# Patient Record
Sex: FEMALE | Race: WHITE | NOT HISPANIC OR LATINO | Employment: FULL TIME | URBAN - METROPOLITAN AREA
[De-identification: names, ages, dates, MRNs, and addresses within clinical notes are randomized per-mention and may not be internally consistent; named-entity substitution may affect disease eponyms.]

---

## 2024-08-08 ENCOUNTER — TELEPHONE (OUTPATIENT)
Dept: ENDOCRINOLOGY | Facility: CLINIC | Age: 49
End: 2024-08-08
Payer: COMMERCIAL

## 2024-08-12 ENCOUNTER — TELEPHONE (OUTPATIENT)
Dept: ENDOCRINOLOGY | Facility: CLINIC | Age: 49
End: 2024-08-12
Payer: COMMERCIAL

## 2024-08-12 NOTE — TELEPHONE ENCOUNTER
Called pt and got her scheduled for an appt in December. Pt also requested to be put on the wait list. I also let her know where we were located.

## 2024-12-23 NOTE — PROGRESS NOTES
Endocrinology New Patient Visit    Subjective:      Chief Complaint: Consult and Thyroid Nodule (Discuss meds and established care)      HPI: Shana Barry is a 49 y.o. female who is here for an initial evaluation for post-op hypothyroidism. She also has a history of RA, mast cell activation syndrome, EDS and POTS.    She lives close to [facility].    Total thyroidectomy on 1/3/00 at [facility] in [facility] by Dr Harp with surgical pathology showing 2-cm PTC (? tall cell) in left lobe, right lobe negative, 1 perithyroidal LN positive.   She was treated with radioactive iodine at dose of 100 mCi on 5/19/00     Post-therapy Whole Body Scan (WBS) on 5/23/00 showed thyroid bed uptake only.    In 2004 she had a PET scan which revealed abnormal foci. She underwent right modified radical neck dissection (MRND) on 3/26/04 at [facility] [place] in [place] with 18/18 lymph nodes negative.   She is actively being treated with thyroid hormone suppression therapy via levothyroxine at current dose of 205.5 mcg/day     Currently taking:  Liothyronine 5 mg qOther day  Levoxyl 175 mcg daily - taking it appropriately fasting first thing in the AM.    She has some fatigue, excessive sweating in the morning (mostly happens when taking liothyronine), mild tremors.    Denies any other symptoms.   Also on modafinil and atomoxetine.    She had a cyst in her thyroid bed that had increased in size multiple times and had it drained. Something was injected after the last drainage around 8 years ago.  Had US every other year.  Thyroglobulin has been undetectable for the last 5 years at least.    Last US 2019 (outside records)  Examination of the thyroid beds revealed anechoic structure   with posterior shadowing and no flow at 2.23 x 1.41 x 1.98-cm centimeters in   size     Lab Results   Component Value Date    TSH 0.45 09/09/2024    TSH 0.38 (L) 06/04/2024    TSH 3.3 05/25/2006    FREET4 1.44  "06/04/2024       Reviewed past medical, family, social history and updated as appropriate.    Objective:     Vitals:    12/26/24 1112   BP: 134/82   Pulse: 95       Physical Exam  Vitals reviewed.   Constitutional:       General: She is not in acute distress.     Appearance: Normal appearance. She is not toxic-appearing.   Eyes:      Extraocular Movements: Extraocular movements intact.      Conjunctiva/sclera: Conjunctivae normal.      Pupils: Pupils are equal, round, and reactive to light.   Neck:      Thyroid: No thyroid mass, thyromegaly or thyroid tenderness.      Comments: Thyroidectomy and R neck dissection scars  Pulmonary:      Effort: Pulmonary effort is normal. No respiratory distress.   Musculoskeletal:      Cervical back: Neck supple. No tenderness.   Lymphadenopathy:      Cervical: No cervical adenopathy.   Skin:     General: Skin is warm and dry.   Neurological:      General: No focal deficit present.      Mental Status: She is alert and oriented to person, place, and time.   Psychiatric:         Mood and Affect: Mood normal.         Thought Content: Thought content normal.           Wt Readings from Last 30 Encounters:   12/26/24 1112 121.1 kg (266 lb 15.6 oz)        No results found for: "CHOL", "HDL", "LDLCALC", "TRIG", "CHOLHDL"  Lab Results   Component Value Date     09/19/2022    K 4 09/19/2022     09/19/2022    CO2 27 09/19/2022    BUN 9 09/19/2022    CREATININE 0.8 09/19/2022    CALCIUM 9.8 09/19/2022    ALBUMIN 4.5 09/19/2022    ALKPHOS 61 09/19/2022    AST 33 09/19/2022    ALT 35 09/19/2022    ESTGFRAFRICA >90 09/19/2022    EGFRNONAA 89 (L) 09/19/2022    TSH 0.45 09/09/2024      No results found for: "MICALBCREAT"    Assessment/Plan:     Hx of thyroid cancer  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  Right neck dissection was performed in 2004 after PET-CT revealed lymph nodes on the right cervical chain, results were not " consistent with metastatic cancer, this might have been related to her mast cell activation disorder which also causes lymphadenopathy.    Thyroglobulin has been undetectable for the last few years.  She had a thyroid cyst which kept recurring but improved after alcohol ablation.    Repeat thyroglobulin  Repeat ultrasound of the thyroid bed    Postoperative hypothyroidism  Patient was using 5 mg liothyronine every other day and Levoxyl 175 mcg daily.    It seems like liothyronine is causing her to have exacerbation of her mast cell activation symptoms ,so we recommended stopping that medication and seeing how she does on Levoxyl only.    She had also never had a bone scan, given her rheumatoid arthritis and history of suppressed TSH it would be reasonable to get one now.    Repeat TSH in 6 weeks  Obtain DXA scan      Follow up in about 1 year (around 12/26/2025) for thyroid cancer.    Visit today included increased complexity associated with the care of the episodic problem addressed and managing the longitudinal care of the patient due to the serious and/or complex managed problem(s).      Clarita Patel MD  [facility]

## 2024-12-26 ENCOUNTER — OFFICE VISIT (OUTPATIENT)
Dept: ENDOCRINOLOGY | Facility: CLINIC | Age: 49
End: 2024-12-26
Payer: COMMERCIAL

## 2024-12-26 VITALS
DIASTOLIC BLOOD PRESSURE: 82 MMHG | HEART RATE: 95 BPM | BODY MASS INDEX: 44.48 KG/M2 | SYSTOLIC BLOOD PRESSURE: 134 MMHG | HEIGHT: 65 IN | WEIGHT: 267 LBS

## 2024-12-26 DIAGNOSIS — D89.40 MAST CELL ACTIVATION SYNDROME: ICD-10-CM

## 2024-12-26 DIAGNOSIS — E89.0 POSTOPERATIVE HYPOTHYROIDISM: ICD-10-CM

## 2024-12-26 DIAGNOSIS — Z85.850 HX OF THYROID CANCER: Primary | ICD-10-CM

## 2024-12-26 PROCEDURE — 99999 PR PBB SHADOW E&M-EST. PATIENT-LVL IV: CPT | Mod: PBBFAC,,, | Performed by: STUDENT IN AN ORGANIZED HEALTH CARE EDUCATION/TRAINING PROGRAM

## 2024-12-26 RX ORDER — LISINOPRIL AND HYDROCHLOROTHIAZIDE 10; 12.5 MG/1; MG/1
1 TABLET ORAL DAILY
COMMUNITY
Start: 2022-12-25

## 2024-12-26 RX ORDER — MELATONIN 10 MG
10 CAPSULE ORAL DAILY PRN
COMMUNITY

## 2024-12-26 RX ORDER — LEVOTHYROXINE SODIUM 175 UG/1
1 TABLET ORAL DAILY
COMMUNITY

## 2024-12-26 RX ORDER — AMOXICILLIN 250 MG
1 CAPSULE ORAL 2 TIMES DAILY
COMMUNITY
Start: 2024-06-28

## 2024-12-26 RX ORDER — MODAFINIL 200 MG/1
1 TABLET ORAL EVERY MORNING
COMMUNITY

## 2024-12-26 RX ORDER — ACETAMINOPHEN 500 MG
500 TABLET ORAL EVERY 6 HOURS PRN
COMMUNITY
Start: 2024-06-28

## 2024-12-26 RX ORDER — CLONAZEPAM 0.5 MG/1
TABLET ORAL
COMMUNITY
Start: 2024-06-04

## 2024-12-26 RX ORDER — BUPROPION HYDROCHLORIDE 150 MG/1
1 TABLET ORAL DAILY
COMMUNITY

## 2024-12-26 RX ORDER — HYDROXYCHLOROQUINE SULFATE 200 MG/1
200 TABLET, FILM COATED ORAL DAILY
COMMUNITY

## 2024-12-26 RX ORDER — LIOTHYRONINE SODIUM 5 UG/1
5 TABLET ORAL DAILY
COMMUNITY
End: 2024-12-26

## 2024-12-26 RX ORDER — LORATADINE 10 MG/1
10 TABLET ORAL DAILY
COMMUNITY
Start: 2024-08-05

## 2024-12-26 RX ORDER — DICLOFENAC SODIUM 10 MG/G
GEL TOPICAL
COMMUNITY
Start: 2024-10-29

## 2024-12-26 RX ORDER — ATOMOXETINE 40 MG/1
40 CAPSULE ORAL DAILY
COMMUNITY
Start: 2022-07-25

## 2024-12-26 NOTE — ASSESSMENT & PLAN NOTE
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  Right neck dissection was performed in 2004 after PET-CT revealed lymph nodes on the right cervical chain, results were not consistent with metastatic cancer, this might have been related to her mast cell activation disorder which also causes lymphadenopathy.    Thyroglobulin has been undetectable for the last few years.  She had a thyroid cyst which kept recurring but improved after alcohol ablation.    Repeat thyroglobulin  Repeat ultrasound of the thyroid bed

## 2024-12-26 NOTE — ASSESSMENT & PLAN NOTE
Patient was using 5 mg liothyronine every other day and Levoxyl 175 mcg daily.    It seems like liothyronine is causing her to have exacerbation of her mast cell activation symptoms ,so we recommended stopping that medication and seeing how she does on Levoxyl only.    She had also never had a bone scan, given her rheumatoid arthritis and history of suppressed TSH it would be reasonable to get one now.    Repeat TSH in 6 weeks  Obtain DXA scan

## 2024-12-27 NOTE — PROGRESS NOTES
I have seen the patient, reviewed the fellow's history and physical, assessment, plan, and progress note. I have personally interviewed and examined the patient at bedside and agree with the findings.     Finn Brown MD  Endocrinology Staff

## 2025-01-15 ENCOUNTER — HOSPITAL ENCOUNTER (OUTPATIENT)
Dept: RADIOLOGY | Facility: HOSPITAL | Age: 50
Discharge: HOME | End: 2025-01-15
Attending: STUDENT IN AN ORGANIZED HEALTH CARE EDUCATION/TRAINING PROGRAM
Payer: COMMERCIAL

## 2025-01-15 PROCEDURE — 77080 DXA BONE DENSITY AXIAL: CPT | Mod: TC

## 2025-01-15 PROCEDURE — 77080 DXA BONE DENSITY AXIAL: CPT | Mod: 26,,, | Performed by: RADIOLOGY

## 2025-04-07 ENCOUNTER — PATIENT MESSAGE (OUTPATIENT)
Dept: ENDOCRINOLOGY | Facility: CLINIC | Age: 50
End: 2025-04-07
Payer: COMMERCIAL

## 2025-04-07 RX ORDER — LEVOTHYROXINE SODIUM 175 UG/1
175 TABLET ORAL DAILY
Qty: 30 TABLET | Refills: 11 | Status: SHIPPED | OUTPATIENT
Start: 2025-04-07

## 2025-05-04 ENCOUNTER — PATIENT MESSAGE (OUTPATIENT)
Dept: ENDOCRINOLOGY | Facility: CLINIC | Age: 50
End: 2025-05-04
Payer: COMMERCIAL

## 2025-05-30 ENCOUNTER — PATIENT MESSAGE (OUTPATIENT)
Dept: ENDOCRINOLOGY | Facility: CLINIC | Age: 50
End: 2025-05-30
Payer: COMMERCIAL

## 2025-08-15 ENCOUNTER — PATIENT MESSAGE (OUTPATIENT)
Dept: ENDOCRINOLOGY | Facility: CLINIC | Age: 50
End: 2025-08-15
Payer: COMMERCIAL

## 2025-08-23 ENCOUNTER — PATIENT MESSAGE (OUTPATIENT)
Dept: ENDOCRINOLOGY | Facility: CLINIC | Age: 50
End: 2025-08-23
Payer: COMMERCIAL